# Patient Record
Sex: FEMALE | Race: OTHER | HISPANIC OR LATINO
[De-identification: names, ages, dates, MRNs, and addresses within clinical notes are randomized per-mention and may not be internally consistent; named-entity substitution may affect disease eponyms.]

---

## 2019-04-01 ENCOUNTER — TRANSCRIPTION ENCOUNTER (OUTPATIENT)
Age: 39
End: 2019-04-01

## 2019-05-06 ENCOUNTER — OUTPATIENT (OUTPATIENT)
Dept: OUTPATIENT SERVICES | Facility: HOSPITAL | Age: 39
LOS: 1 days | End: 2019-05-06
Payer: MEDICAID

## 2019-05-06 ENCOUNTER — APPOINTMENT (OUTPATIENT)
Dept: ULTRASOUND IMAGING | Facility: HOSPITAL | Age: 39
End: 2019-05-06
Payer: COMMERCIAL

## 2019-05-06 DIAGNOSIS — D25.9 LEIOMYOMA OF UTERUS, UNSPECIFIED: ICD-10-CM

## 2019-05-06 DIAGNOSIS — R10.2 PELVIC AND PERINEAL PAIN: ICD-10-CM

## 2019-05-06 PROCEDURE — 76830 TRANSVAGINAL US NON-OB: CPT

## 2019-05-06 PROCEDURE — 76830 TRANSVAGINAL US NON-OB: CPT | Mod: 26

## 2019-05-06 PROCEDURE — 76856 US EXAM PELVIC COMPLETE: CPT | Mod: 26

## 2019-05-06 PROCEDURE — 76856 US EXAM PELVIC COMPLETE: CPT

## 2019-10-21 ENCOUNTER — EMERGENCY (EMERGENCY)
Facility: HOSPITAL | Age: 39
LOS: 1 days | Discharge: HOME | End: 2019-10-21
Attending: EMERGENCY MEDICINE | Admitting: EMERGENCY MEDICINE
Payer: MEDICAID

## 2019-10-21 VITALS
DIASTOLIC BLOOD PRESSURE: 79 MMHG | HEART RATE: 85 BPM | TEMPERATURE: 98 F | WEIGHT: 117.07 LBS | RESPIRATION RATE: 17 BRPM | OXYGEN SATURATION: 98 % | HEIGHT: 63 IN | SYSTOLIC BLOOD PRESSURE: 125 MMHG

## 2019-10-21 DIAGNOSIS — N93.9 ABNORMAL UTERINE AND VAGINAL BLEEDING, UNSPECIFIED: ICD-10-CM

## 2019-10-21 LAB
ALBUMIN SERPL ELPH-MCNC: 3.6 G/DL — SIGNIFICANT CHANGE UP (ref 3.3–5)
ALP SERPL-CCNC: 68 U/L — SIGNIFICANT CHANGE UP (ref 40–120)
ALT FLD-CCNC: 10 U/L — SIGNIFICANT CHANGE UP (ref 10–45)
ANION GAP SERPL CALC-SCNC: 10 MMOL/L — SIGNIFICANT CHANGE UP (ref 5–17)
APPEARANCE UR: CLEAR — SIGNIFICANT CHANGE UP
APTT BLD: 29.6 SEC — SIGNIFICANT CHANGE UP (ref 27.5–36.3)
AST SERPL-CCNC: 10 U/L — SIGNIFICANT CHANGE UP (ref 10–40)
BACTERIA # UR AUTO: ABNORMAL /HPF
BASOPHILS # BLD AUTO: 0.04 K/UL — SIGNIFICANT CHANGE UP (ref 0–0.2)
BASOPHILS NFR BLD AUTO: 0.6 % — SIGNIFICANT CHANGE UP (ref 0–2)
BILIRUB SERPL-MCNC: 0.4 MG/DL — SIGNIFICANT CHANGE UP (ref 0.2–1.2)
BILIRUB UR-MCNC: NEGATIVE — SIGNIFICANT CHANGE UP
BLD GP AB SCN SERPL QL: SIGNIFICANT CHANGE UP
BUN SERPL-MCNC: 9 MG/DL — SIGNIFICANT CHANGE UP (ref 7–23)
CALCIUM SERPL-MCNC: 9.4 MG/DL — SIGNIFICANT CHANGE UP (ref 8.4–10.5)
CHLORIDE SERPL-SCNC: 103 MMOL/L — SIGNIFICANT CHANGE UP (ref 96–108)
CO2 SERPL-SCNC: 27 MMOL/L — SIGNIFICANT CHANGE UP (ref 22–31)
COLOR SPEC: YELLOW — SIGNIFICANT CHANGE UP
CREAT SERPL-MCNC: 0.55 MG/DL — SIGNIFICANT CHANGE UP (ref 0.5–1.3)
DIFF PNL FLD: ABNORMAL
EOSINOPHIL # BLD AUTO: 0.09 K/UL — SIGNIFICANT CHANGE UP (ref 0–0.5)
EOSINOPHIL NFR BLD AUTO: 1.3 % — SIGNIFICANT CHANGE UP (ref 0–6)
EPI CELLS # UR: SIGNIFICANT CHANGE UP
GLUCOSE SERPL-MCNC: 100 MG/DL — HIGH (ref 70–99)
GLUCOSE UR QL: NEGATIVE — SIGNIFICANT CHANGE UP
HCG SERPL-ACNC: 5158 MIU/ML — HIGH
HCT VFR BLD CALC: 38.1 % — SIGNIFICANT CHANGE UP (ref 34.5–45)
HGB BLD-MCNC: 12.8 G/DL — SIGNIFICANT CHANGE UP (ref 11.5–15.5)
IMM GRANULOCYTES NFR BLD AUTO: 0.3 % — SIGNIFICANT CHANGE UP (ref 0–1.5)
INR BLD: 1.03 RATIO — SIGNIFICANT CHANGE UP (ref 0.88–1.16)
KETONES UR-MCNC: NEGATIVE — SIGNIFICANT CHANGE UP
LEUKOCYTE ESTERASE UR-ACNC: NEGATIVE — SIGNIFICANT CHANGE UP
LYMPHOCYTES # BLD AUTO: 2.2 K/UL — SIGNIFICANT CHANGE UP (ref 1–3.3)
LYMPHOCYTES # BLD AUTO: 31.2 % — SIGNIFICANT CHANGE UP (ref 13–44)
MAGNESIUM SERPL-MCNC: 1.9 MG/DL — SIGNIFICANT CHANGE UP (ref 1.6–2.6)
MCHC RBC-ENTMCNC: 29.9 PG — SIGNIFICANT CHANGE UP (ref 27–34)
MCHC RBC-ENTMCNC: 33.6 GM/DL — SIGNIFICANT CHANGE UP (ref 32–36)
MCV RBC AUTO: 89 FL — SIGNIFICANT CHANGE UP (ref 80–100)
MONOCYTES # BLD AUTO: 0.41 K/UL — SIGNIFICANT CHANGE UP (ref 0–0.9)
MONOCYTES NFR BLD AUTO: 5.8 % — SIGNIFICANT CHANGE UP (ref 2–14)
NEUTROPHILS # BLD AUTO: 4.3 K/UL — SIGNIFICANT CHANGE UP (ref 1.8–7.4)
NEUTROPHILS NFR BLD AUTO: 60.8 % — SIGNIFICANT CHANGE UP (ref 43–77)
NITRITE UR-MCNC: NEGATIVE — SIGNIFICANT CHANGE UP
NRBC # BLD: 0 /100 WBCS — SIGNIFICANT CHANGE UP (ref 0–0)
PH UR: 7 — SIGNIFICANT CHANGE UP (ref 5–8)
PLATELET # BLD AUTO: 254 K/UL — SIGNIFICANT CHANGE UP (ref 150–400)
POTASSIUM SERPL-MCNC: 3.4 MMOL/L — LOW (ref 3.5–5.3)
POTASSIUM SERPL-SCNC: 3.4 MMOL/L — LOW (ref 3.5–5.3)
PROT SERPL-MCNC: 7.5 G/DL — SIGNIFICANT CHANGE UP (ref 6–8.3)
PROT UR-MCNC: NEGATIVE — SIGNIFICANT CHANGE UP
PROTHROM AB SERPL-ACNC: 11.5 SEC — SIGNIFICANT CHANGE UP (ref 10–12.9)
RBC # BLD: 4.28 M/UL — SIGNIFICANT CHANGE UP (ref 3.8–5.2)
RBC # FLD: 11.9 % — SIGNIFICANT CHANGE UP (ref 10.3–14.5)
RBC CASTS # UR COMP ASSIST: SIGNIFICANT CHANGE UP /HPF (ref 0–4)
SODIUM SERPL-SCNC: 140 MMOL/L — SIGNIFICANT CHANGE UP (ref 135–145)
SP GR SPEC: 1 — LOW (ref 1.01–1.02)
UROBILINOGEN FLD QL: NEGATIVE — SIGNIFICANT CHANGE UP
WBC # BLD: 7.06 K/UL — SIGNIFICANT CHANGE UP (ref 3.8–10.5)
WBC # FLD AUTO: 7.06 K/UL — SIGNIFICANT CHANGE UP (ref 3.8–10.5)
WBC UR QL: NEGATIVE /HPF — SIGNIFICANT CHANGE UP (ref 0–5)

## 2019-10-21 PROCEDURE — 76817 TRANSVAGINAL US OBSTETRIC: CPT

## 2019-10-21 PROCEDURE — 83735 ASSAY OF MAGNESIUM: CPT

## 2019-10-21 PROCEDURE — 86900 BLOOD TYPING SEROLOGIC ABO: CPT

## 2019-10-21 PROCEDURE — 76817 TRANSVAGINAL US OBSTETRIC: CPT | Mod: 26

## 2019-10-21 PROCEDURE — 99284 EMERGENCY DEPT VISIT MOD MDM: CPT

## 2019-10-21 PROCEDURE — 85610 PROTHROMBIN TIME: CPT

## 2019-10-21 PROCEDURE — 80053 COMPREHEN METABOLIC PANEL: CPT

## 2019-10-21 PROCEDURE — 85730 THROMBOPLASTIN TIME PARTIAL: CPT

## 2019-10-21 PROCEDURE — 76801 OB US < 14 WKS SINGLE FETUS: CPT | Mod: 26

## 2019-10-21 PROCEDURE — 76801 OB US < 14 WKS SINGLE FETUS: CPT

## 2019-10-21 PROCEDURE — 86850 RBC ANTIBODY SCREEN: CPT

## 2019-10-21 PROCEDURE — 85027 COMPLETE CBC AUTOMATED: CPT

## 2019-10-21 PROCEDURE — 86901 BLOOD TYPING SEROLOGIC RH(D): CPT

## 2019-10-21 PROCEDURE — 84702 CHORIONIC GONADOTROPIN TEST: CPT

## 2019-10-21 PROCEDURE — 36415 COLL VENOUS BLD VENIPUNCTURE: CPT

## 2019-10-21 PROCEDURE — 81001 URINALYSIS AUTO W/SCOPE: CPT

## 2019-10-21 PROCEDURE — 87086 URINE CULTURE/COLONY COUNT: CPT

## 2019-10-21 NOTE — ED PROVIDER NOTE - PATIENT PORTAL LINK FT
You can access the FollowMyHealth Patient Portal offered by [facility] by registering at the following website: [email or web address]. By joining [employer]’s FollowMyHealth portal, you will also be able to view your health information using other applications (apps) compatible with our system.

## 2019-10-21 NOTE — ED PROVIDER NOTE - OBJECTIVE STATEMENT
[id number] weeks pregnant by datesPresents to ER with complaints of vaginal spotting.Pelvic cramping. No complains of flank pain no complains of nausea vomiting

## 2019-10-21 NOTE — ED ADULT NURSE NOTE - NSIMPLEMENTINTERV_GEN_ALL_ED
Implemented All Universal Safety Interventions:  [place] to call system. Call bell, personal items and telephone within reach. Instruct patient to call for assistance. Room bathroom lighting operational. Non-slip footwear when patient is off stretcher. Physically safe environment: no spills, clutter or unnecessary equipment. Stretcher in lowest position, wheels locked, appropriate side rails in place.

## 2019-10-21 NOTE — ED PROVIDER NOTE - NS ED ROS FT
no weight change, no fever or chills  no recent travel, no recent abox, no sick contacts  no recent change in medications  no rash, no bruises  no visual changes no eye discharge  no cough cold or congestion,   no sob, no chest pain  no abd pain, no n/v/d  no hematuria, no change in urinary habits  no joint pain, no deformity  no headache, no paresthesia no weight change, no fever or chills  no recent travel, no recent abox, no sick contacts  no recent change in medications  no rash, no bruises  no visual changes no eye discharge  no cough cold or congestion,   no sob, no chest pain  no abd pain, no n/v/d  no hematuria, no change in urinary habits  No complaints of dysuria,   no joint pain, no deformity  no headache, no paresthesia

## 2019-10-21 NOTE — ED ADULT NURSE NOTE - OBJECTIVE STATEMENT
Pt came to ED c/o vaginal bleeding and LLQ abdominal pain. Pt with PMH of uterine fibroids came to ED c/o vaginal bleeding and LLQ abdominal pain. Pt states she is 12 weeks pregnant, LMP 19. LLQ pain began on Friday and pt thought it was gas, pain is described as intermittent and 7/10. Vaginal bleeding began today, described as dark brown and then became bright red, pt states she has "drops" and notices it when she wipes after using the bathroom. Pt is taking prenatal vitamins and scheduled for first US on 10/30/19. Pt denies urinary symptoms, denies fever, chills, n/v today. G4 T1  L1

## 2019-10-21 NOTE — ED PROVIDER NOTE - PHYSICAL EXAMINATION
Constitutional : Appears comfortably, talking in full sentences  No dizziness with change in position  Head :NC AT , no swelling  Eyes :eomi, no swelling  Mouth :mm moist,  Neck : supple, trachea in midline  Chest :Branden air entry, symm chest expansion, no distress  Heart :S1 S2 distant  Abdomen :abd soft, non tender  Musc/Skel :ext no swelling, no deformity,   no spine tenderness, distal pulses present  Neuro  :AAO 3 no focal deficits

## 2019-10-21 NOTE — ED PROVIDER NOTE - PROGRESS NOTE DETAILS
called [phone] than [id number] spoke to sara /gyn chief resident aaron called [phone] than [id number] spoke to sara /gyn chief resident aaron sara gyn, spoke to her, she will follow up patiet was told don't see hb, may be off by dates recommend a follow up study to evalaute and compare growth. patients  was told that there is fetal demise, and he was given copy of results Patient given copy of results

## 2019-10-22 ENCOUNTER — EMERGENCY (EMERGENCY)
Facility: HOSPITAL | Age: 39
LOS: 1 days | Discharge: HOME | End: 2019-10-22
Attending: EMERGENCY MEDICINE | Admitting: EMERGENCY MEDICINE
Payer: MEDICAID

## 2019-10-22 VITALS
TEMPERATURE: 98 F | SYSTOLIC BLOOD PRESSURE: 115 MMHG | HEART RATE: 100 BPM | OXYGEN SATURATION: 98 % | WEIGHT: 117.07 LBS | DIASTOLIC BLOOD PRESSURE: 82 MMHG | HEIGHT: 63 IN | RESPIRATION RATE: 18 BRPM

## 2019-10-22 VITALS
SYSTOLIC BLOOD PRESSURE: 116 MMHG | HEART RATE: 74 BPM | OXYGEN SATURATION: 99 % | DIASTOLIC BLOOD PRESSURE: 70 MMHG | RESPIRATION RATE: 16 BRPM

## 2019-10-22 LAB
ANION GAP SERPL CALC-SCNC: 11 MMOL/L — SIGNIFICANT CHANGE UP (ref 5–17)
BASOPHILS # BLD AUTO: 0.03 K/UL — SIGNIFICANT CHANGE UP (ref 0–0.2)
BASOPHILS NFR BLD AUTO: 0.2 % — SIGNIFICANT CHANGE UP (ref 0–2)
BUN SERPL-MCNC: 8 MG/DL — SIGNIFICANT CHANGE UP (ref 7–23)
CALCIUM SERPL-MCNC: 8.9 MG/DL — SIGNIFICANT CHANGE UP (ref 8.4–10.5)
CHLORIDE SERPL-SCNC: 101 MMOL/L — SIGNIFICANT CHANGE UP (ref 96–108)
CO2 SERPL-SCNC: 23 MMOL/L — SIGNIFICANT CHANGE UP (ref 22–31)
CREAT SERPL-MCNC: 0.58 MG/DL — SIGNIFICANT CHANGE UP (ref 0.5–1.3)
EOSINOPHIL # BLD AUTO: 0.06 K/UL — SIGNIFICANT CHANGE UP (ref 0–0.5)
EOSINOPHIL NFR BLD AUTO: 0.5 % — SIGNIFICANT CHANGE UP (ref 0–6)
GLUCOSE SERPL-MCNC: 115 MG/DL — HIGH (ref 70–99)
HCG SERPL-ACNC: 2415 MIU/ML — HIGH
HCT VFR BLD CALC: 37.3 % — SIGNIFICANT CHANGE UP (ref 34.5–45)
HGB BLD-MCNC: 12.7 G/DL — SIGNIFICANT CHANGE UP (ref 11.5–15.5)
IMM GRANULOCYTES NFR BLD AUTO: 0.4 % — SIGNIFICANT CHANGE UP (ref 0–1.5)
LYMPHOCYTES # BLD AUTO: 1.89 K/UL — SIGNIFICANT CHANGE UP (ref 1–3.3)
LYMPHOCYTES # BLD AUTO: 14.6 % — SIGNIFICANT CHANGE UP (ref 13–44)
MCHC RBC-ENTMCNC: 29.7 PG — SIGNIFICANT CHANGE UP (ref 27–34)
MCHC RBC-ENTMCNC: 34 GM/DL — SIGNIFICANT CHANGE UP (ref 32–36)
MCV RBC AUTO: 87.4 FL — SIGNIFICANT CHANGE UP (ref 80–100)
MONOCYTES # BLD AUTO: 0.68 K/UL — SIGNIFICANT CHANGE UP (ref 0–0.9)
MONOCYTES NFR BLD AUTO: 5.2 % — SIGNIFICANT CHANGE UP (ref 2–14)
NEUTROPHILS # BLD AUTO: 10.26 K/UL — HIGH (ref 1.8–7.4)
NEUTROPHILS NFR BLD AUTO: 79.1 % — HIGH (ref 43–77)
NRBC # BLD: 0 /100 WBCS — SIGNIFICANT CHANGE UP (ref 0–0)
PLATELET # BLD AUTO: 265 K/UL — SIGNIFICANT CHANGE UP (ref 150–400)
POTASSIUM SERPL-MCNC: 3.4 MMOL/L — LOW (ref 3.5–5.3)
POTASSIUM SERPL-SCNC: 3.4 MMOL/L — LOW (ref 3.5–5.3)
RBC # BLD: 4.27 M/UL — SIGNIFICANT CHANGE UP (ref 3.8–5.2)
RBC # FLD: 11.9 % — SIGNIFICANT CHANGE UP (ref 10.3–14.5)
SODIUM SERPL-SCNC: 135 MMOL/L — SIGNIFICANT CHANGE UP (ref 135–145)
WBC # BLD: 12.97 K/UL — HIGH (ref 3.8–10.5)
WBC # FLD AUTO: 12.97 K/UL — HIGH (ref 3.8–10.5)

## 2019-10-22 PROCEDURE — 99284 EMERGENCY DEPT VISIT MOD MDM: CPT

## 2019-10-22 RX ORDER — SODIUM CHLORIDE 9 MG/ML
1000 INJECTION INTRAMUSCULAR; INTRAVENOUS; SUBCUTANEOUS ONCE
Refills: 0 | Status: COMPLETED | OUTPATIENT
Start: 2019-10-22 | End: 2019-10-22

## 2019-10-22 RX ADMIN — SODIUM CHLORIDE 1000 MILLILITER(S): 9 INJECTION INTRAMUSCULAR; INTRAVENOUS; SUBCUTANEOUS at 23:30

## 2019-10-22 RX ADMIN — SODIUM CHLORIDE 1000 MILLILITER(S): 9 INJECTION INTRAMUSCULAR; INTRAVENOUS; SUBCUTANEOUS at 22:30

## 2019-10-22 NOTE — ED ADULT NURSE NOTE - OBJECTIVE STATEMENT
38 yr old female walked into ED accompanied by  c/o vaginal bleeding and passing clots since 1300 and intermittent dizziness; states she is saturating about 3 pads an hour; pt was seen yesterday with confirmed miscarriage; last period 07/29/2019

## 2019-10-22 NOTE — ED ADULT TRIAGE NOTE - CHIEF COMPLAINT QUOTE
Pt states she is 12 weeks pregnant with spotting that started yesterday and today progressing to heavy bleeding of more than 2 pads/hour and cramping pain. Pt states she received an ultrasound yesterday and there was no heart beat. Pt also c/o dizziness.

## 2019-10-23 VITALS
DIASTOLIC BLOOD PRESSURE: 63 MMHG | HEART RATE: 81 BPM | RESPIRATION RATE: 17 BRPM | TEMPERATURE: 99 F | OXYGEN SATURATION: 100 % | SYSTOLIC BLOOD PRESSURE: 100 MMHG

## 2019-10-23 PROBLEM — D21.9 BENIGN NEOPLASM OF CONNECTIVE AND OTHER SOFT TISSUE, UNSPECIFIED: Chronic | Status: ACTIVE | Noted: 2019-10-21

## 2019-10-23 LAB
CULTURE RESULTS: SIGNIFICANT CHANGE UP
SPECIMEN SOURCE: SIGNIFICANT CHANGE UP

## 2019-10-23 PROCEDURE — 96374 THER/PROPH/DIAG INJ IV PUSH: CPT

## 2019-10-23 PROCEDURE — 76817 TRANSVAGINAL US OBSTETRIC: CPT | Mod: 26

## 2019-10-23 PROCEDURE — 84702 CHORIONIC GONADOTROPIN TEST: CPT

## 2019-10-23 PROCEDURE — 99284 EMERGENCY DEPT VISIT MOD MDM: CPT | Mod: 25

## 2019-10-23 PROCEDURE — 36415 COLL VENOUS BLD VENIPUNCTURE: CPT

## 2019-10-23 PROCEDURE — 85027 COMPLETE CBC AUTOMATED: CPT

## 2019-10-23 PROCEDURE — 96361 HYDRATE IV INFUSION ADD-ON: CPT

## 2019-10-23 PROCEDURE — 80048 BASIC METABOLIC PNL TOTAL CA: CPT

## 2019-10-23 PROCEDURE — 76817 TRANSVAGINAL US OBSTETRIC: CPT

## 2019-10-23 RX ORDER — ACETAMINOPHEN 500 MG
975 TABLET ORAL ONCE
Refills: 0 | Status: COMPLETED | OUTPATIENT
Start: 2019-10-23 | End: 2019-10-23

## 2019-10-23 RX ORDER — KETOROLAC TROMETHAMINE 30 MG/ML
30 SYRINGE (ML) INJECTION ONCE
Refills: 0 | Status: DISCONTINUED | OUTPATIENT
Start: 2019-10-23 | End: 2019-10-23

## 2019-10-23 RX ORDER — SODIUM CHLORIDE 9 MG/ML
1000 INJECTION INTRAMUSCULAR; INTRAVENOUS; SUBCUTANEOUS ONCE
Refills: 0 | Status: COMPLETED | OUTPATIENT
Start: 2019-10-23 | End: 2019-10-23

## 2019-10-23 RX ADMIN — Medication 30 MILLIGRAM(S): at 01:27

## 2019-10-23 RX ADMIN — SODIUM CHLORIDE 1000 MILLILITER(S): 9 INJECTION INTRAMUSCULAR; INTRAVENOUS; SUBCUTANEOUS at 01:00

## 2019-10-23 RX ADMIN — Medication 30 MILLIGRAM(S): at 01:08

## 2019-10-23 RX ADMIN — SODIUM CHLORIDE 1000 MILLILITER(S): 9 INJECTION INTRAMUSCULAR; INTRAVENOUS; SUBCUTANEOUS at 01:50

## 2019-10-23 NOTE — ED PROVIDER NOTE - OBJECTIVE STATEMENT
pt c/o vag bleeding, heavy , started more mild 1pm today, since 6pm bleeding about 3 pads /hr with tissue since, assoc c pelvic cramps. feeling little dizzy. no chest pain, sob. no fever. was seen here yesterday for vag bleeding and had sono c/w fetal demise.  LMP 7/29/19

## 2019-10-23 NOTE — ED PROVIDER NOTE - PATIENT PORTAL LINK FT
You can access the FollowMyHealth Patient Portal offered by [facility] by registering at the following website: [email or web address]. By joining [employer]’s FollowMyHealth portal, you will also be able to view your health information using other applications (apps) compatible with our system.

## 2019-10-23 NOTE — ED PROVIDER NOTE - CARE PROVIDER_API CALL
Yvonne Gudino)  OBSN  General  [address] [place] [id number]  Phone: [phone]  Fax: [phone]  Follow Up Time: 1-3 Days

## 2019-10-23 NOTE — ED PROVIDER NOTE - CLINICAL SUMMARY MEDICAL DECISION MAKING FREE TEXT BOX
vag bleeding with pelvic cramps in pregnant pt, noted to have fetal demise on sono yesterday. r/o signif anemia/blood loss, retained POC.  no signs of infx. check labs hcg, repeat sono. give iv fluids. vag bleeding with pelvic cramps in pregnant pt, noted to have fetal demise on sono yesterday. r/o signif anemia/blood loss, retained POC.  no signs of infx. check labs hcg, repeat sono. give iv fluids.    0140: hcg downtrending. sono showing possible RPOC.  pt feeling  better. pain improved. vag bleeding significantly decreased. states she has mild bleeding, have not soaked 1 pad over the last hr.  case d/w OB Dr Archuleta. results of lab, sono and pt condition d/w him. he states pt can f/u with her OB. no intervention needed at this time

## 2019-10-23 NOTE — ED PROVIDER NOTE - PRIOR HOSPITAL/ED VISITS SUMMARY FREE TEXT FOR MDM OBTAINED AND REVIEWED OLD RECORDS QUESTION
ED visit from 10/21/19 reviewed. h/h noted. pelvic sono : 9 wks, no FH. c/w fetal demise.  blood type Rh+ .  hcg 5000s

## 2019-10-23 NOTE — ED PROVIDER NOTE - NSFOLLOWUPINSTRUCTIONS_ED_ALL_ED_FT
-see your OB Dr Gudino today for follow up  -return for worse  bleeding , pain, feeling faint, short of breath, fever or other concerns    Miscarriage    WHAT YOU NEED TO KNOW:    A miscarriage is the loss of a fetus within the first 20 weeks of pregnancy. A miscarriage may also be called a spontaneous  or an early pregnancy loss.     DISCHARGE INSTRUCTIONS:    Return to the emergency department if:     You have foul-smelling drainage or pus coming from your vagina.      You have heavy vaginal bleeding and soak 1 pad or more in an hour.       You have severe abdominal pain.      You feel like your heart is beating faster than normal.       You feel extremely weak or dizzy.     Contact your healthcare provider if:     You have a fever greater than 100.4°F or chills.       You have extreme sadness, grief, or feel unable to cope with what has happened.       You have questions or concerns about your condition or care.    Self-care:     Do not put anything in your vagina for 2 weeks or as directed. Do not use tampons, douche, or have sex. These actions can cause infection and pain.       Use sanitary pads as needed. You may have light bleeding or spotting for 2 weeks.       Do not take a bath or go swimming for 2 weeks or as directed. These actions may increase your risk for an infection. Take showers only.       Rest as needed. Slowly start to do more each day. Return to your daily activities as directed.       Talk to your healthcare provider about birth control. If you would like to prevent another pregnancy, ask your healthcare provider which type of birth control is best for you.       Join a support group or therapy to help you cope. A miscarriage may be very difficult for you, your partner, and other members of your family. There is no right way to feel after a miscarriage. You may feel overwhelming grief or other emotions. It may be helpful to talk to a friend, family member, or counselor about your feelings. You may worry that you could have another miscarriage. Talk to your healthcare provider about your concerns. He may be able to help you reduce the risk for another miscarriage. He may also help you find ways to cope with grief.     For more information:     The [employer]  [address] [address]  [place] [address]  Phone: [phone]  Phone: [phone]  Web Address: http://www.acog.org      [employer]  [address] [place] [address]  Web Address: [email or web address]      Follow up with your healthcare provider as directed: You may need to see your healthcare provider for blood tests or an ultrasound. Write down your questions so you remember to ask them during your visits.

## 2019-10-25 ENCOUNTER — OUTPATIENT (OUTPATIENT)
Dept: OUTPATIENT SERVICES | Facility: HOSPITAL | Age: 39
LOS: 1 days | End: 2019-10-25
Payer: MEDICAID

## 2019-10-25 ENCOUNTER — LABORATORY RESULT (OUTPATIENT)
Age: 39
End: 2019-10-25

## 2019-10-25 ENCOUNTER — APPOINTMENT (OUTPATIENT)
Dept: OBGYN | Facility: CLINIC | Age: 39
End: 2019-10-25
Payer: MEDICAID

## 2019-10-25 VITALS
WEIGHT: 116 LBS | SYSTOLIC BLOOD PRESSURE: 122 MMHG | BODY MASS INDEX: 21.35 KG/M2 | HEIGHT: 62 IN | DIASTOLIC BLOOD PRESSURE: 80 MMHG

## 2019-10-25 DIAGNOSIS — O03.9 COMPLETE OR UNSPECIFIED SPONTANEOUS ABORTION WITHOUT COMPLICATION: ICD-10-CM

## 2019-10-25 DIAGNOSIS — N76.0 ACUTE VAGINITIS: ICD-10-CM

## 2019-10-25 DIAGNOSIS — O03.9 COMPLETE OR UNSPECIFIED SPONTANEOUS ABORTION W/OUT COMPLICATION: ICD-10-CM

## 2019-10-25 LAB — HCG SERPL-ACNC: 353 MIU/ML — HIGH

## 2019-10-25 PROCEDURE — 76830 TRANSVAGINAL US NON-OB: CPT

## 2019-10-25 PROCEDURE — 84702 CHORIONIC GONADOTROPIN TEST: CPT

## 2019-10-25 PROCEDURE — G0463: CPT

## 2019-10-25 PROCEDURE — 99214 OFFICE O/P EST MOD 30 MIN: CPT | Mod: NC

## 2019-10-25 PROCEDURE — 76830 TRANSVAGINAL US NON-OB: CPT | Mod: 26,NC

## 2019-10-27 DIAGNOSIS — N93.9 ABNORMAL UTERINE AND VAGINAL BLEEDING, UNSPECIFIED: ICD-10-CM

## 2019-10-28 ENCOUNTER — TRANSCRIPTION ENCOUNTER (OUTPATIENT)
Age: 39
End: 2019-10-28

## 2019-10-28 ENCOUNTER — INPATIENT (INPATIENT)
Facility: HOSPITAL | Age: 39
LOS: 1 days | Discharge: HOME | DRG: 770 | End: 2019-10-30
Attending: OBSTETRICS & GYNECOLOGY | Admitting: OBSTETRICS & GYNECOLOGY
Payer: MEDICAID

## 2019-10-28 VITALS
DIASTOLIC BLOOD PRESSURE: 69 MMHG | RESPIRATION RATE: 18 BRPM | TEMPERATURE: 99 F | HEART RATE: 130 BPM | OXYGEN SATURATION: 99 % | SYSTOLIC BLOOD PRESSURE: 105 MMHG

## 2019-10-28 DIAGNOSIS — O03.9 COMPLETE OR UNSPECIFIED SPONTANEOUS ABORTION WITHOUT COMPLICATION: ICD-10-CM

## 2019-10-28 LAB
ALBUMIN SERPL ELPH-MCNC: 4.3 G/DL — SIGNIFICANT CHANGE UP (ref 3.3–5)
ALP SERPL-CCNC: 71 U/L — SIGNIFICANT CHANGE UP (ref 40–120)
ALT FLD-CCNC: 7 U/L — LOW (ref 10–45)
ANION GAP SERPL CALC-SCNC: 14 MMOL/L — SIGNIFICANT CHANGE UP (ref 5–17)
APPEARANCE UR: CLEAR — SIGNIFICANT CHANGE UP
APTT BLD: 25.5 SEC — LOW (ref 27.5–36.3)
AST SERPL-CCNC: 17 U/L — SIGNIFICANT CHANGE UP (ref 10–40)
BASOPHILS # BLD AUTO: 0.01 K/UL — SIGNIFICANT CHANGE UP (ref 0–0.2)
BASOPHILS NFR BLD AUTO: 0.1 % — SIGNIFICANT CHANGE UP (ref 0–2)
BILIRUB SERPL-MCNC: 0.4 MG/DL — SIGNIFICANT CHANGE UP (ref 0.2–1.2)
BILIRUB UR-MCNC: NEGATIVE — SIGNIFICANT CHANGE UP
BLD GP AB SCN SERPL QL: NEGATIVE — SIGNIFICANT CHANGE UP
BUN SERPL-MCNC: 5 MG/DL — LOW (ref 7–23)
CALCIUM SERPL-MCNC: 9.1 MG/DL — SIGNIFICANT CHANGE UP (ref 8.4–10.5)
CHLORIDE SERPL-SCNC: 103 MMOL/L — SIGNIFICANT CHANGE UP (ref 96–108)
CO2 SERPL-SCNC: 22 MMOL/L — SIGNIFICANT CHANGE UP (ref 22–31)
COLOR SPEC: SIGNIFICANT CHANGE UP
CREAT SERPL-MCNC: 0.57 MG/DL — SIGNIFICANT CHANGE UP (ref 0.5–1.3)
DIFF PNL FLD: ABNORMAL
EOSINOPHIL # BLD AUTO: 0.01 K/UL — SIGNIFICANT CHANGE UP (ref 0–0.5)
EOSINOPHIL NFR BLD AUTO: 0.1 % — SIGNIFICANT CHANGE UP (ref 0–6)
GAS PNL BLDV: SIGNIFICANT CHANGE UP
GLUCOSE SERPL-MCNC: 127 MG/DL — HIGH (ref 70–99)
GLUCOSE UR QL: NEGATIVE — SIGNIFICANT CHANGE UP
HCG SERPL-ACNC: 189.6 MIU/ML — HIGH
HCT VFR BLD CALC: 32.2 % — LOW (ref 34.5–45)
HGB BLD-MCNC: 10.9 G/DL — LOW (ref 11.5–15.5)
IMM GRANULOCYTES NFR BLD AUTO: 0.7 % — SIGNIFICANT CHANGE UP (ref 0–1.5)
INR BLD: 1.03 RATIO — SIGNIFICANT CHANGE UP (ref 0.88–1.16)
KETONES UR-MCNC: SIGNIFICANT CHANGE UP
LEUKOCYTE ESTERASE UR-ACNC: ABNORMAL
LYMPHOCYTES # BLD AUTO: 1.1 K/UL — SIGNIFICANT CHANGE UP (ref 1–3.3)
LYMPHOCYTES # BLD AUTO: 9.2 % — LOW (ref 13–44)
MCHC RBC-ENTMCNC: 29.9 PG — SIGNIFICANT CHANGE UP (ref 27–34)
MCHC RBC-ENTMCNC: 33.9 GM/DL — SIGNIFICANT CHANGE UP (ref 32–36)
MCV RBC AUTO: 88.5 FL — SIGNIFICANT CHANGE UP (ref 80–100)
MONOCYTES # BLD AUTO: 0.51 K/UL — SIGNIFICANT CHANGE UP (ref 0–0.9)
MONOCYTES NFR BLD AUTO: 4.3 % — SIGNIFICANT CHANGE UP (ref 2–14)
NEUTROPHILS # BLD AUTO: 10.26 K/UL — HIGH (ref 1.8–7.4)
NEUTROPHILS NFR BLD AUTO: 85.6 % — HIGH (ref 43–77)
NITRITE UR-MCNC: NEGATIVE — SIGNIFICANT CHANGE UP
NRBC # BLD: 0 /100 WBCS — SIGNIFICANT CHANGE UP (ref 0–0)
PH UR: 6 — SIGNIFICANT CHANGE UP (ref 5–8)
PLATELET # BLD AUTO: 269 K/UL — SIGNIFICANT CHANGE UP (ref 150–400)
POTASSIUM SERPL-MCNC: 3.9 MMOL/L — SIGNIFICANT CHANGE UP (ref 3.5–5.3)
POTASSIUM SERPL-SCNC: 3.9 MMOL/L — SIGNIFICANT CHANGE UP (ref 3.5–5.3)
PROT SERPL-MCNC: 7.3 G/DL — SIGNIFICANT CHANGE UP (ref 6–8.3)
PROT UR-MCNC: NEGATIVE — SIGNIFICANT CHANGE UP
PROTHROM AB SERPL-ACNC: 11.8 SEC — SIGNIFICANT CHANGE UP (ref 10–12.9)
RBC # BLD: 3.64 M/UL — LOW (ref 3.8–5.2)
RBC # FLD: 12.2 % — SIGNIFICANT CHANGE UP (ref 10.3–14.5)
RH IG SCN BLD-IMP: POSITIVE — SIGNIFICANT CHANGE UP
SODIUM SERPL-SCNC: 139 MMOL/L — SIGNIFICANT CHANGE UP (ref 135–145)
SP GR SPEC: 1.01 — LOW (ref 1.01–1.02)
UROBILINOGEN FLD QL: NEGATIVE — SIGNIFICANT CHANGE UP
WBC # BLD: 11.97 K/UL — HIGH (ref 3.8–10.5)
WBC # FLD AUTO: 11.97 K/UL — HIGH (ref 3.8–10.5)

## 2019-10-28 PROCEDURE — 99285 EMERGENCY DEPT VISIT HI MDM: CPT

## 2019-10-28 RX ORDER — ACETAMINOPHEN 500 MG
975 TABLET ORAL ONCE
Refills: 0 | Status: COMPLETED | OUTPATIENT
Start: 2019-10-28 | End: 2019-10-28

## 2019-10-28 RX ORDER — SODIUM CHLORIDE 9 MG/ML
2000 INJECTION INTRAMUSCULAR; INTRAVENOUS; SUBCUTANEOUS ONCE
Refills: 0 | Status: COMPLETED | OUTPATIENT
Start: 2019-10-28 | End: 2019-10-28

## 2019-10-28 RX ORDER — CEFOTETAN DISODIUM 1 G
2 VIAL (EA) INJECTION ONCE
Refills: 0 | Status: COMPLETED | OUTPATIENT
Start: 2019-10-28 | End: 2019-10-28

## 2019-10-28 RX ADMIN — SODIUM CHLORIDE 2000 MILLILITER(S): 9 INJECTION INTRAMUSCULAR; INTRAVENOUS; SUBCUTANEOUS at 22:50

## 2019-10-28 RX ADMIN — Medication 975 MILLIGRAM(S): at 22:50

## 2019-10-28 RX ADMIN — Medication 110 MILLIGRAM(S): at 23:20

## 2019-10-28 NOTE — CONSULT NOTE ADULT - PROBLEM SELECTOR RECOMMENDATION 9
RECOMMENDATIONS:  -CBC, Coags, CMP, T&S w/ confirmatory  -TVUS  -Monitor vitals  -Tylenol for fever  -Can give Cefotetan/doxy in ED  - IVF  -GYN Will re-evaluate patient after labs/TVUS to determine best treatment plan    Maurizio Garay PGY-2 d/w Dr. Platt

## 2019-10-28 NOTE — CONSULT NOTE ADULT - SUBJECTIVE AND OBJECTIVE BOX
R2 GYN ED CONSULT NOTE    SUBJECTIVE:    37yo  s/p SAB on 10/22 presenting to the ED for fever to 102F at home and tachycardia. Pt came to the ED and said she was about 12wks pregnant on 10/21. Had  TVUS demonstrating a demise. Patient had significant bleeding and passed tissue. She had a repeat TVUS on 10/22 that demonstrated no fetus. Was followed in clinic on 10/25 and had a downtrending bHCG. Today, patient complains of new onset RLQ tenderness and fever to 102F at home. Patient said she has minimal vaginal bleeding and feels well otherwise. Patient said she didnt take anything for her fever at home but came to the ED. Patient was afebrile to 99F in the ED but was tachycardic to 130.     bHC(10/21)->2400 (10/22)->353 (10/25).     Pt denies fever, chills, nausea, vomiting, diarrhea, headache, constipation, dizzyness, syncope, chest pain, palpitations, shortness of breath, dysuria, urgency, frequency, vaginal discharge/odor/pain/itching, breast lumps/bumps, dyspareunia.    Primary OB/GYN: Clinic  OBH: 1x , 3x SAB  GYNH: denies hx ov cysts, abnl paps, sti, +Fibroid 5cm anterior  PMSH: Open appendectomy  MEDS: none  ALL: nkda  SOCH: denies t/e/d; safe at home  FAMH: denies fam hx of breast/uterine/ovarian/colon cancer  ROS: negative except per hpi    OBJECTIVE:    VITAL SIGNS:  Vital Signs Last 24 Hrs  T(C): 37.3 (28 Oct 2019 19:42), Max: 37.3 (28 Oct 2019 19:42)  T(F): 99.1 (28 Oct 2019 19:42), Max: 99.1 (28 Oct 2019 19:42)  HR: 130 (28 Oct 2019 19:42) (130 - 130)  BP: 105/69 (28 Oct 2019 19:42) (105/69 - 105/69)  BP(mean): --  RR: 18 (28 Oct 2019 19:42) (18 - 18)  SpO2: 99% (28 Oct 2019 19:42) (99% - 99%)    CAPILLARY BLOOD GLUCOSE            PHYSICAL EXAM:  GEN: NAD, A&Ox3  ABD: soft, ND, mild RLQ tenderness, no guarding/rebound  SPECULUM: Scant dark blood in vault  PELVIC: No CMT, Os dilated to approximately 1cm  EXT: WWP, no edema/TTP

## 2019-10-28 NOTE — ED ADULT NURSE NOTE - OBJECTIVE STATEMENT
Pt had a miscarriage last week and now has lower abd crampy abd pain, brown vaginal discharge and fever to 102.1 earlier today.   Abd soft, denies urinary sx.

## 2019-10-28 NOTE — ED PROVIDER NOTE - ATTENDING CONTRIBUTION TO CARE
Patient recent spontaneous , downtrending BHCG, today developed lower abdominal pains and fevers so presenting to Emergency Department for evaluation.  No APAP/motrin/antipyretics to date.  No cough, sore throat, nausea, vomiting, diarrhea, dysuria.  Otherwise healthy.  No known sick contacts.  No vaginal discharge, still having small bleeding mixed with mucus.    A 14 point review of systems is negative except as in HPI or otherwise documented.    Exam:  General: Patient non toxic appearing, febrile, tachycardic otherwise vital signs within normal limits  HEENT: airway patent with moist mucous membranes  Cardiac: regular tachycardia S1/S2 with strong peripheral pulses  Respiratory: lungs clear without respiratory distress  GI: abdomen soft, suprapubic tenderness to palpation  : deferred pending OBGYN  Neuro: no gross neurologic deficits  Skin: warm, well perfused  Psych: normal mood and affect    Patient with recent spontaneous  with downtrending hemoglobin, last US in system from two days ago "Heterogeneous material containing scattered vascularity distending the endometrium and endocervical canal to 2.4 cm, likely represent retained products of conception and hemorrhage. Small focus of fluid in the endocervical canal near the cervical os" - now with fevers/chills - concern for RPOC or endometritis - will obtain labs, UA, pan cultures, treat fevers, start antibiotics, TVUS, OBGYN consultation.

## 2019-10-28 NOTE — ED PROVIDER NOTE - OBJECTIVE STATEMENT
39 y/o female recently here for incomplete  on 10/23, estimated 12 weeks pregnant by her dates presents to the ED for fever, lower abdominal pain starting earlier today. reports some discharge, no heavy bleeding. no dysuria, no cough, uri sx, vomiting or diarrhea.

## 2019-10-28 NOTE — ED ADULT NURSE NOTE - NSIMPLEMENTINTERV_GEN_ALL_ED
Implemented All Universal Safety Interventions:  [place] to call system. Call bell, personal items and telephone within reach. Instruct patient to call for assistance. Room bathroom lighting operational. Non-slip footwear when patient is off stretcher. Physically safe environment: no spills, clutter or unnecessary equipment. Stretcher in lowest position, wheels locked, appropriate side rails in place.

## 2019-10-28 NOTE — CONSULT NOTE ADULT - ASSESSMENT
ASSESSMENT: Pt is a 39YO  s/p SAB on 10/22 presenting w/ fever at home to 102F and tachycardia. Patient feels well other than mild RLQ tenderness. Diagnosis is retained products vs. Endometritis

## 2019-10-29 DIAGNOSIS — O03.4 INCOMPLETE SPONTANEOUS ABORTION WITHOUT COMPLICATION: ICD-10-CM

## 2019-10-29 DIAGNOSIS — Z09 ENCOUNTER FOR FOLLOW-UP EXAMINATION AFTER COMPLETED TREATMENT FOR CONDITIONS OTHER THAN MALIGNANT NEOPLASM: ICD-10-CM

## 2019-10-29 DIAGNOSIS — Z90.49 ACQUIRED ABSENCE OF OTHER SPECIFIED PARTS OF DIGESTIVE TRACT: Chronic | ICD-10-CM

## 2019-10-29 DIAGNOSIS — A41.9 SEPSIS, UNSPECIFIED ORGANISM: ICD-10-CM

## 2019-10-29 LAB
BASE EXCESS BLDV CALC-SCNC: -0.7 MMOL/L — SIGNIFICANT CHANGE UP (ref -2–2)
CA-I SERPL-SCNC: 1.11 MMOL/L — LOW (ref 1.12–1.3)
CHLORIDE BLDV-SCNC: 113 MMOL/L — HIGH (ref 96–108)
CO2 BLDV-SCNC: 25 MMOL/L — SIGNIFICANT CHANGE UP (ref 22–30)
CULTURE RESULTS: SIGNIFICANT CHANGE UP
GAS PNL BLDV: 140 MMOL/L — SIGNIFICANT CHANGE UP (ref 135–145)
GAS PNL BLDV: SIGNIFICANT CHANGE UP
GAS PNL BLDV: SIGNIFICANT CHANGE UP
GLUCOSE BLDV-MCNC: 110 MG/DL — HIGH (ref 70–99)
HCO3 BLDV-SCNC: 24 MMOL/L — SIGNIFICANT CHANGE UP (ref 21–29)
HCT VFR BLDA CALC: 30 % — LOW (ref 39–50)
HGB BLD CALC-MCNC: 9.8 G/DL — LOW (ref 11.5–15.5)
HOROWITZ INDEX BLDV+IHG-RTO: SIGNIFICANT CHANGE UP
LACTATE BLDV-MCNC: 1.5 MMOL/L — SIGNIFICANT CHANGE UP (ref 0.7–2)
PCO2 BLDV: 39 MMHG — SIGNIFICANT CHANGE UP (ref 35–50)
PH BLDV: 7.4 — SIGNIFICANT CHANGE UP (ref 7.35–7.45)
PO2 BLDV: 39 MMHG — SIGNIFICANT CHANGE UP (ref 25–45)
POTASSIUM BLDV-SCNC: 3.7 MMOL/L — SIGNIFICANT CHANGE UP (ref 3.5–5.3)
SAO2 % BLDV: 70 % — SIGNIFICANT CHANGE UP (ref 67–88)
SPECIMEN SOURCE: SIGNIFICANT CHANGE UP

## 2019-10-29 PROCEDURE — 76830 TRANSVAGINAL US NON-OB: CPT | Mod: 26

## 2019-10-29 PROCEDURE — 88305 TISSUE EXAM BY PATHOLOGIST: CPT | Mod: 26

## 2019-10-29 PROCEDURE — 59840 INDUCED ABORTION D&C: CPT

## 2019-10-29 PROCEDURE — 93975 VASCULAR STUDY: CPT | Mod: 26

## 2019-10-29 RX ORDER — HYDROMORPHONE HYDROCHLORIDE 2 MG/ML
0.5 INJECTION INTRAMUSCULAR; INTRAVENOUS; SUBCUTANEOUS
Refills: 0 | Status: DISCONTINUED | OUTPATIENT
Start: 2019-10-29 | End: 2019-10-29

## 2019-10-29 RX ORDER — ACETAMINOPHEN 500 MG
975 TABLET ORAL EVERY 6 HOURS
Refills: 0 | Status: DISCONTINUED | OUTPATIENT
Start: 2019-10-29 | End: 2019-10-30

## 2019-10-29 RX ORDER — SODIUM CHLORIDE 9 MG/ML
1000 INJECTION, SOLUTION INTRAVENOUS
Refills: 0 | Status: DISCONTINUED | OUTPATIENT
Start: 2019-10-29 | End: 2019-10-30

## 2019-10-29 RX ORDER — ONDANSETRON 8 MG/1
4 TABLET, FILM COATED ORAL ONCE
Refills: 0 | Status: DISCONTINUED | OUTPATIENT
Start: 2019-10-29 | End: 2019-10-29

## 2019-10-29 RX ORDER — GENTAMICIN SULFATE 40 MG/ML
250 VIAL (ML) INJECTION ONCE
Refills: 0 | Status: COMPLETED | OUTPATIENT
Start: 2019-10-29 | End: 2019-10-29

## 2019-10-29 RX ORDER — KETOROLAC TROMETHAMINE 30 MG/ML
15 SYRINGE (ML) INJECTION EVERY 6 HOURS
Refills: 0 | Status: DISCONTINUED | OUTPATIENT
Start: 2019-10-29 | End: 2019-10-29

## 2019-10-29 RX ORDER — HEPARIN SODIUM 5000 [USP'U]/ML
5000 INJECTION INTRAVENOUS; SUBCUTANEOUS EVERY 12 HOURS
Refills: 0 | Status: DISCONTINUED | OUTPATIENT
Start: 2019-10-29 | End: 2019-10-30

## 2019-10-29 RX ORDER — IBUPROFEN 200 MG
600 TABLET ORAL EVERY 6 HOURS
Refills: 0 | Status: DISCONTINUED | OUTPATIENT
Start: 2019-10-29 | End: 2019-10-30

## 2019-10-29 RX ORDER — ACETAMINOPHEN 500 MG
1000 TABLET ORAL ONCE
Refills: 0 | Status: COMPLETED | OUTPATIENT
Start: 2019-10-29 | End: 2019-10-29

## 2019-10-29 RX ORDER — SODIUM CHLORIDE 9 MG/ML
1000 INJECTION, SOLUTION INTRAVENOUS
Refills: 0 | Status: DISCONTINUED | OUTPATIENT
Start: 2019-10-29 | End: 2019-10-29

## 2019-10-29 RX ADMIN — Medication 100 MILLIGRAM(S): at 13:52

## 2019-10-29 RX ADMIN — Medication 1000 MILLIGRAM(S): at 08:30

## 2019-10-29 RX ADMIN — Medication 975 MILLIGRAM(S): at 00:25

## 2019-10-29 RX ADMIN — Medication 400 MILLIGRAM(S): at 08:04

## 2019-10-29 RX ADMIN — SODIUM CHLORIDE 125 MILLILITER(S): 9 INJECTION, SOLUTION INTRAVENOUS at 11:34

## 2019-10-29 RX ADMIN — Medication 100 MILLIGRAM(S): at 22:03

## 2019-10-29 RX ADMIN — HEPARIN SODIUM 5000 UNIT(S): 5000 INJECTION INTRAVENOUS; SUBCUTANEOUS at 18:36

## 2019-10-29 RX ADMIN — Medication 100 GRAM(S): at 00:25

## 2019-10-29 RX ADMIN — SODIUM CHLORIDE 2000 MILLILITER(S): 9 INJECTION INTRAMUSCULAR; INTRAVENOUS; SUBCUTANEOUS at 00:26

## 2019-10-29 RX ADMIN — Medication 100 MILLIGRAM(S): at 00:25

## 2019-10-29 RX ADMIN — Medication 500 MILLIGRAM(S): at 15:58

## 2019-10-29 RX ADMIN — Medication 110 MILLIGRAM(S): at 06:05

## 2019-10-29 NOTE — PRE-ANESTHESIA EVALUATION ADULT - NSANTHOSAYNRD_GEN_A_CORE
No. GALDINO screening performed.  STOP BANG Legend: 0-2 = LOW Risk; 3-4 = INTERMEDIATE Risk; 5-8 = HIGH Risk

## 2019-10-29 NOTE — BRIEF OPERATIVE NOTE - OPERATION/FINDINGS
9 week sized anteverted uterus, retained products of conception removed with suction curettage under sono guidance

## 2019-10-29 NOTE — H&P ADULT - PROBLEM SELECTOR PLAN 1
-CBC, Coags, CMP, T&S w/ confirmatory  -TVUS  -Monitor vitals  -Tylenol for fever  -Can give Cefotetan/doxy in ED  - IVF  -Add on for Urgent D&C for 9am patient agrees and was consented  -Will d/c on Doxy 100 BID for 10 days    Maurizio Garay PGY-2 d/w Dr. Platt.

## 2019-10-29 NOTE — BRIEF OPERATIVE NOTE - NSICDXBRIEFPREOP_GEN_ALL_CORE_FT
PRE-OP DIAGNOSIS:  Retained products of conception after miscarriage 29-Oct-2019 10:39:40  Dot Brown

## 2019-10-29 NOTE — H&P ADULT - ASSESSMENT
ASSESSMENT: Pt is a 37YO  s/p SAB on 10/22 presenting w/ fever at home to 102F and tachycardia. Patient feels well other than mild RLQ tenderness. Diagnosis is retained products vs. Endometritis vs. Septic Ab. ASSESSMENT: Pt is a 37YO  s/p SAB on 10/22 presenting w/ fever at home to 102F and tachycardia. Patient feels well other than mild LLQ tenderness. Diagnosis is retained products vs. Endometritis vs. Septic Ab.

## 2019-10-29 NOTE — H&P ADULT - NSHPPHYSICALEXAM_GEN_ALL_CORE
Vital Signs Last 24 Hrs  T(C): 37.2 (28 Oct 2019 23:26), Max: 37.9 (28 Oct 2019 22:10)  T(F): 98.9 (28 Oct 2019 23:26), Max: 100.3 (28 Oct 2019 22:10)  HR: 106 (28 Oct 2019 23:26) (103 - 130)  BP: 102/64 (28 Oct 2019 23:26) (101/61 - 111/69)  BP(mean): --  RR: 16 (28 Oct 2019 23:26) (16 - 18)  SpO2: 100% (28 Oct 2019 23:26) (99% - 100%)    PHYSICAL EXAM:  GEN: NAD, A&Ox3  ABD: soft, ND, mild RLQ tenderness, no guarding/rebound  SPECULUM: Scant dark blood in vault  PELVIC: No CMT, Os dilated to approximately 1cm  EXT: WWP, no edema/TTP

## 2019-10-29 NOTE — BRIEF OPERATIVE NOTE - NSICDXBRIEFPOSTOP_GEN_ALL_CORE_FT
POST-OP DIAGNOSIS:  Retained products of conception after miscarriage 29-Oct-2019 10:39:57  Dot Brown

## 2019-10-29 NOTE — PRE-OP CHECKLIST - LATEX ALLERGY
Initial Anesthesia Post-op Note    Patient: Apolinar Hdez  Procedure(s) Performed: ARTHROPLASTY KNEE TOTAL BILATERAL - BILATERAL  Anesthesia type: General    Vital Last Value   Temperature 36.3 °C (97.3 °F) (02/05/19 1230)   Pulse 90 (02/05/19 1230)   Respiratory Rate 8 (02/05/19 1230)   Non-Invasive   Blood Pressure (!) 140/91 (02/05/19 1230)   Arterial  Blood Pressure     Pulse Oximetry 96 % (02/05/19 1230)     Last 24 I/O:     Intake/Output Summary (Last 24 hours) at 2/5/2019 1236  Last data filed at 2/5/2019 1225  Gross per 24 hour   Intake 1300 ml   Output 200 ml   Net 1100 ml       PATIENT LOCATION: PACU Phase 1  POST-OP VITAL SIGNS: stable  LEVEL OF CONSCIOUSNESS: sedated  RESPIRATORY STATUS: spontaneous ventilation  CARDIOVASCULAR: blood pressure returned to baseline  HYDRATION: euvolemic    PAIN MANAGEMENT: well controlled  NAUSEA: None  AIRWAY PATENCY: patent  POST-OP ASSESSMENT: no complications, patient tolerated procedure well with no complications and sufficiently recovered from acute administration of anesthesia effects and able to participate in evaluation  COMPLICATIONS: none  HANDOFF:  Handoff to receiving nurse was performed and questions were answered (Rina LEWIS)       no

## 2019-10-29 NOTE — PROGRESS NOTE ADULT - PROBLEM SELECTOR PLAN 2
- Added on to OR  - Consent signed  - T&S x2 completed  - IV Tylenol for fever  - Continue Doxy IV  - Continue LR@125  - Continue NPO    MICHELLE Domínguez PGY2

## 2019-10-29 NOTE — PRE-ANESTHESIA EVALUATION ADULT - BMI (KG/M2)
Patient Education   Use nasal saline rinse twice daily for next week. Nasal decongestant spray for 3 days only. When You Have a Sore Throat  A sore throat can be painful. There are many reasons why you may have a sore throat. Your healthcare provider will work with you to find the cause of your sore throat. He or she will also find the best treatment for you. What Causes a Sore Throat? Sore throats can be caused or worsened by:  Â· Cold or flu viruses  Â· Bacteria  Â· Irritants such as tobacco smoke  Â· Acid reflux  A Healthy Throat  The tonsils are on the sides of the throat near the base of the tongue. They collect viruses and bacteria and help fight infection. The throat (pharynx) is the passage for air. Mucus from the nasal cavity also moves down the passage. An Inflamed Throat  The tonsils and pharynx can become inflamed due to a cold or flu virus. Postnasal drip (excess mucus draining from the nasal cavity) can irritate the throat. It can also make the throat or tonsils more likely to be infected by bacteria. Severe, untreated tonsillitis in children or adults can cause a pocket of pus (abscess) to form near the tonsil. Your Evaluation  A medical evaluation can help find the cause of your sore throat. It can also help your healthcare providerÂ choose the best treatment for you. The evaluation may include a health history, physical exam, and diagnostic tests. Health History  Your healthcare provider may ask you the following:  Â· How long has the sore throat lasted and how have you been treating it? Â· Do you have any other symptoms, such as body aches, fever, or cough? Â· Does your sore throat recur? If so, how often? How many days of school or work have you missed because of a sore throat? Â· Do you have trouble eating or swallowing? Â· Have you been told that you snore or have other sleep problems? Â· Do you have bad breath? Â· Do you cough up bad-tasting mucus?   Physical Exam  During the exam, your healthcare provider checks your ears, nose, and throat for problems. He or she also checks for swelling in the neck, and may listen to your chest.  Possible Tests  Other tests your healthcare provider may perform include:  Â· A throat swab to check for bacteria such asÂ streptococcus (the bacteria that causes strep throat)  Â· A blood test to check for mononucleosis (a viral infection)  Â· A chest x-ray to rule out pneumonia, especially if you have a cough  Treating a Sore Throat  Treatment depends on many factors. What is the likely cause? Is the problem recent? Does it keep coming back? In many cases, the best thing to do is to treat the symptoms, rest, and let the problem heal itself. Antibiotics may help clear up some infections. For cases of severe or recurring tonsillitis, the tonsils may need to be removed. Relieving Your Symptoms  Â· Donât smoke, and avoid secondhand smoke. Â· For children, try throat sprays or Popsicles. Adults and older children may try lozenges. Â· Drink warm liquids to soothe the throat and help thin mucus. Avoid alcohol, spicy foods, and acidic drinks such as orange juice. These can irritate the throat. Â· Gargle with warm saltwater (1Â teaspoon of salt toÂ 8Â ounces of warm water). Â· Use a humidifier to keep air moist and relieve throat dryness. Â· Try over-the-counter pain relievers such as acetaminophen or ibuprofen. Use as directed, and donât exceed the recommended dose. Donât give aspirin to children. Are Antibiotics Needed? If your sore throat is due to a bacterial infection, antibiotics may speed healing and prevent complications. But most sore throats are caused by cold or flu viruses. And antibiotics donât treat viral illness. In fact, using antibiotics when theyâre not needed may produce bacteria that are harder to kill. Your healthcare provider will prescribe antibiotics only if he or she thinks they are likely to help.   If Antibiotics Are Prescribed  Take the medication exactly as directed. Be sure to finish your prescription even if youâre feeling better. Â  And be sure to ask your healthcare provider or pharmacist what side effects are common and what to do about them. Is Surgery Needed? In some cases, tonsils need to be removed. This is often done as outpatient (same-day) surgery. Your healthcare provider may advise removing the tonsils in cases of:  Â· Several severe bouts of tonsillitis in a year. âSevereâ episodes include those that lead to missed days of school or work, or that need to be treated with antibiotics. Â· Tonsillitis that causes breathing problems during sleep. Â· Tonsillitis caused by food particles collecting in pouches in the tonsils (cryptic tonsillitis). Call your healthcare provider if any of the following occur:  Â· Symptoms worsen, or new symptoms develop. Â· Swollen tonsils make breathing difficult. Â· The pain is severe enough to keep you from drinking liquids. Â· A skin rash, hives, or wheezing develops. Any of these could signal an allergic reaction to antibiotics. Â· Symptoms donât improve within a week. Â· Symptoms donât improve withinÂ 2-3Â days of starting antibiotics. Â© [address] [facility] [place] [address] Pkw[address], [place], [address] All rights reserved. This information is not intended as a substitute for professional medical care. Always follow your healthcare professional's instructions. 20.7

## 2019-10-29 NOTE — H&P ADULT - ATTENDING COMMENTS
Attending Note     Patient assessed with resident. Presented with fevers T102 and tachycardia. She reports LLQ pain, intermittent.  She was previously assessed 1 week ago with diagnosis of missed AB followed by POC/empty uterus with decreasing betaHCG level.   Patient reports having chills and feeling hot with LLQ pain at home; reports pain is minimal at this time and states since being given medication and antibiotics she feels better. Denies dysuria, nausea/vomiting. Last meal was 8:30p.   On exam, minimal LLQ tenderness. Resident exam reviewed; SVE 1 cm dilated.   Labs and Imaging reviewed, sonogram noted for retained POC and hypervascularity.   Rhesus positive   Due to dilated cervix, abdominal pain, fevers at home and tachycardia with mildly elevated WBC count, highly suspicious for infected retained POC   -discussed with patient and recommend D&C, patient agreeable  -discussed with OR as there are other emergent cases she will be added on for 9am   -continue IV antibiotics and IV fluid hydration   NATALY Platt Attending Note     Patient assessed with resident. Presented with fevers T102 and tachycardia. She reports LLQ pain, intermittent.  She was previously assessed 1 week ago with diagnosis of missed AB followed by POC/empty uterus with decreasing betaHCG level.   Patient reports having chills and feeling hot with LLQ pain at home; reports pain is minimal at this time and states since being given medication and antibiotics she feels better. Denies dysuria, nausea/vomiting. Last meal was 8:30p.   On exam, minimal LLQ tenderness. Resident exam reviewed; SVE 1 cm dilated.   Labs and Imaging reviewed, sonogram noted for retained POC and hypervascularity.   Rhesus positive   Due to dilated cervix, abdominal pain, fevers at home and tachycardia with mildly elevated WBC count, highly suspicious for infected retained POC   -discussed with patient and recommend D&C, patient agreeable  -discussed with OR as there are other emergent cases she will be added on for 9am   -continue IV antibiotics and IV fluid hydration   NATALY Platt    Attending Note  Patient seen at bedside, consented, agree to D&C under ultrasound guidance

## 2019-10-29 NOTE — H&P ADULT - HISTORY OF PRESENT ILLNESS
39yo  s/p SAB on 10/22 presenting to the ED for fever to 102F at home and tachycardia. Pt came to the ED and said she was about 12wks pregnant on 10/21. Had  TVUS demonstrating a demise. Patient had significant bleeding and passed tissue. She had a repeat TVUS on 10/22 that demonstrated no fetus. Was followed in clinic on 10/25 and had a downtrending bHCG. Today, patient complains of new onset RLQ tenderness and fever to 102F at home. Patient said she has minimal vaginal bleeding and feels well otherwise. Patient said she didnt take anything for her fever at home but came to the ED. Patient was afebrile to 99F in the ED but was tachycardic to 130.     bHC(10/21)->2400 (10/22)->353 (10/25).     Pt denies fever, chills, nausea, vomiting, diarrhea, headache, constipation, dizzyness, syncope, chest pain, palpitations, shortness of breath, dysuria, urgency, frequency, vaginal discharge/odor/pain/itching, breast lumps/bumps, dyspareunia.    Primary OB/GYN: Clinic  OBH: 1x , 3x SAB  GYNH: denies hx ov cysts, abnl paps, sti, +Fibroid 5cm  PMSH: Open appendectomy  MEDS: none  ALL: nkda  SOCH: denies t/e/d; safe at home  FAMH: denies fam hx of breast/uterine/ovarian/colon cancer 37yo  s/p SAB on 10/22 presenting to the ED for fever to 102F at home and tachycardia. Pt came to the ED and said she was about 12wks pregnant on 10/21. Had  TVUS demonstrating a demise. Patient had significant bleeding and passed tissue. She had a repeat TVUS on 10/22 that demonstrated no fetus. Was followed in clinic on 10/25 and had a downtrending bHCG. Today, patient complains of new onset LLQ tenderness and fever to 102F at home. Patient said she has minimal vaginal bleeding and feels well otherwise. Patient said she didnt take anything for her fever at home but came to the ED. Patient was afebrile to 99F in the ED but was tachycardic to 130.     bHC(10/21)->2400 (10/22)->353 (10/25).     Pt denies fever, chills, nausea, vomiting, diarrhea, headache, constipation, dizzyness, syncope, chest pain, palpitations, shortness of breath, dysuria, urgency, frequency, vaginal discharge/odor/pain/itching, breast lumps/bumps, dyspareunia.    Primary OB/GYN: Clinic  OBH: 1x , 3x SAB  GYNH: denies hx ov cysts, abnl paps, sti, +Fibroid 5cm  PMSH: Open appendectomy  MEDS: none  ALL: nkda  SOCH: denies t/e/d; safe at home  FAMH: denies fam hx of breast/uterine/ovarian/colon cancer

## 2019-10-29 NOTE — H&P ADULT - NSHPLABSRESULTS_GEN_ALL_CORE
LMP: 7/29/2019    COMPARISON: Pelvic ultrasound from 10/22/2019.    TECHNIQUE:     Endovaginal and transabdominal pelvic sonogram. Color and Spectral   Doppler was performed.     FINDINGS:    Uterus: 8.4 x 5.9 x 6.2 cm. Few uterine fibroids measuring up to3.3 x   3.3 x 3.3 cm.    Endometrium: Heterogeneous and thickened endometrium measuring 16 mm with   high velocity, low-resistance, trophoblastic waveforms. Waveforms of up   to 80 cm/s noted.    Right ovary: 2.8 x 1.6 x 2 cm. Within normal limits. Normal venous   waveform.    Left ovary: 3.1 x 1.9 x 2.4 cm. Within normal limits. Normal arterial and   venous waveforms.    Fluid: None.    IMPRESSION:    Findings compatible with retained products of conception.

## 2019-10-30 ENCOUNTER — APPOINTMENT (OUTPATIENT)
Dept: OBGYN | Facility: CLINIC | Age: 39
End: 2019-10-30

## 2019-10-30 ENCOUNTER — TRANSCRIPTION ENCOUNTER (OUTPATIENT)
Age: 39
End: 2019-10-30

## 2019-10-30 VITALS
OXYGEN SATURATION: 99 % | HEART RATE: 74 BPM | DIASTOLIC BLOOD PRESSURE: 68 MMHG | RESPIRATION RATE: 18 BRPM | TEMPERATURE: 98 F | SYSTOLIC BLOOD PRESSURE: 100 MMHG

## 2019-10-30 DIAGNOSIS — Z98.890 OTHER SPECIFIED POSTPROCEDURAL STATES: ICD-10-CM

## 2019-10-30 LAB
BASOPHILS # BLD AUTO: 0.01 K/UL — SIGNIFICANT CHANGE UP (ref 0–0.2)
BASOPHILS NFR BLD AUTO: 0.1 % — SIGNIFICANT CHANGE UP (ref 0–2)
EOSINOPHIL # BLD AUTO: 0.05 K/UL — SIGNIFICANT CHANGE UP (ref 0–0.5)
EOSINOPHIL NFR BLD AUTO: 0.7 % — SIGNIFICANT CHANGE UP (ref 0–6)
HCT VFR BLD CALC: 28.4 % — LOW (ref 34.5–45)
HGB BLD-MCNC: 9.2 G/DL — LOW (ref 11.5–15.5)
IMM GRANULOCYTES NFR BLD AUTO: 0.8 % — SIGNIFICANT CHANGE UP (ref 0–1.5)
LYMPHOCYTES # BLD AUTO: 1.94 K/UL — SIGNIFICANT CHANGE UP (ref 1–3.3)
LYMPHOCYTES # BLD AUTO: 26.8 % — SIGNIFICANT CHANGE UP (ref 13–44)
MCHC RBC-ENTMCNC: 29.3 PG — SIGNIFICANT CHANGE UP (ref 27–34)
MCHC RBC-ENTMCNC: 32.4 GM/DL — SIGNIFICANT CHANGE UP (ref 32–36)
MCV RBC AUTO: 90.4 FL — SIGNIFICANT CHANGE UP (ref 80–100)
MONOCYTES # BLD AUTO: 0.56 K/UL — SIGNIFICANT CHANGE UP (ref 0–0.9)
MONOCYTES NFR BLD AUTO: 7.7 % — SIGNIFICANT CHANGE UP (ref 2–14)
NEUTROPHILS # BLD AUTO: 4.63 K/UL — SIGNIFICANT CHANGE UP (ref 1.8–7.4)
NEUTROPHILS NFR BLD AUTO: 63.9 % — SIGNIFICANT CHANGE UP (ref 43–77)
NRBC # BLD: 0 /100 WBCS — SIGNIFICANT CHANGE UP (ref 0–0)
PLATELET # BLD AUTO: 240 K/UL — SIGNIFICANT CHANGE UP (ref 150–400)
RBC # BLD: 3.14 M/UL — LOW (ref 3.8–5.2)
RBC # FLD: 12.5 % — SIGNIFICANT CHANGE UP (ref 10.3–14.5)
WBC # BLD: 7.25 K/UL — SIGNIFICANT CHANGE UP (ref 3.8–10.5)
WBC # FLD AUTO: 7.25 K/UL — SIGNIFICANT CHANGE UP (ref 3.8–10.5)

## 2019-10-30 PROCEDURE — 86900 BLOOD TYPING SEROLOGIC ABO: CPT

## 2019-10-30 PROCEDURE — 93975 VASCULAR STUDY: CPT

## 2019-10-30 PROCEDURE — 86850 RBC ANTIBODY SCREEN: CPT

## 2019-10-30 PROCEDURE — 96361 HYDRATE IV INFUSION ADD-ON: CPT

## 2019-10-30 PROCEDURE — 76830 TRANSVAGINAL US NON-OB: CPT

## 2019-10-30 PROCEDURE — 93005 ELECTROCARDIOGRAM TRACING: CPT

## 2019-10-30 PROCEDURE — 82330 ASSAY OF CALCIUM: CPT

## 2019-10-30 PROCEDURE — 82435 ASSAY OF BLOOD CHLORIDE: CPT

## 2019-10-30 PROCEDURE — 85730 THROMBOPLASTIN TIME PARTIAL: CPT

## 2019-10-30 PROCEDURE — 83605 ASSAY OF LACTIC ACID: CPT

## 2019-10-30 PROCEDURE — 82565 ASSAY OF CREATININE: CPT

## 2019-10-30 PROCEDURE — 84132 ASSAY OF SERUM POTASSIUM: CPT

## 2019-10-30 PROCEDURE — 85610 PROTHROMBIN TIME: CPT

## 2019-10-30 PROCEDURE — 99285 EMERGENCY DEPT VISIT HI MDM: CPT | Mod: 25

## 2019-10-30 PROCEDURE — 81001 URINALYSIS AUTO W/SCOPE: CPT

## 2019-10-30 PROCEDURE — 85014 HEMATOCRIT: CPT

## 2019-10-30 PROCEDURE — 84295 ASSAY OF SERUM SODIUM: CPT

## 2019-10-30 PROCEDURE — 96365 THER/PROPH/DIAG IV INF INIT: CPT

## 2019-10-30 PROCEDURE — 96375 TX/PRO/DX INJ NEW DRUG ADDON: CPT

## 2019-10-30 PROCEDURE — 85027 COMPLETE CBC AUTOMATED: CPT

## 2019-10-30 PROCEDURE — 84702 CHORIONIC GONADOTROPIN TEST: CPT

## 2019-10-30 PROCEDURE — 86901 BLOOD TYPING SEROLOGIC RH(D): CPT

## 2019-10-30 PROCEDURE — 88305 TISSUE EXAM BY PATHOLOGIST: CPT

## 2019-10-30 PROCEDURE — 87040 BLOOD CULTURE FOR BACTERIA: CPT

## 2019-10-30 PROCEDURE — 82947 ASSAY GLUCOSE BLOOD QUANT: CPT

## 2019-10-30 PROCEDURE — 87086 URINE CULTURE/COLONY COUNT: CPT

## 2019-10-30 PROCEDURE — 80053 COMPREHEN METABOLIC PANEL: CPT

## 2019-10-30 PROCEDURE — 82803 BLOOD GASES ANY COMBINATION: CPT

## 2019-10-30 RX ORDER — IBUPROFEN 200 MG
1 TABLET ORAL
Qty: 0 | Refills: 0 | DISCHARGE
Start: 2019-10-30

## 2019-10-30 RX ORDER — ACETAMINOPHEN 500 MG
3 TABLET ORAL
Qty: 0 | Refills: 0 | DISCHARGE
Start: 2019-10-30

## 2019-10-30 RX ADMIN — HEPARIN SODIUM 5000 UNIT(S): 5000 INJECTION INTRAVENOUS; SUBCUTANEOUS at 05:44

## 2019-10-30 RX ADMIN — Medication 100 MILLIGRAM(S): at 05:43

## 2019-10-30 NOTE — PROGRESS NOTE ADULT - ASSESSMENT
37yo F POD#1 s/p D&C for septic .  Patient stable and progressing well in post-operative state.
A/P: 38y Female S/P D&C   PAST MEDICAL & SURGICAL HISTORY:  Miscarriage  Fibroids: h/o uterine  History of appendectomy
A/P: 38y p/w RLQ pain and fevers at home in the setting of recent SAB. Patient febrile and tachycardic this AM. Awaiting OR for D&C under sono guidance for septic .

## 2019-10-30 NOTE — DISCHARGE NOTE PROVIDER - HOSPITAL COURSE
Patient underwent uncomplicated D&C on 10/29 for septic .  EBL 25.  Hct 32.2-> .  Patient was stable in the immediate post-operative course.  On POD#1 patient was ambulating without difficulty and voiding spontaneously.  Patient discharged on POD#1 in stable condition. Patient underwent uncomplicated D&C on 10/29 for septic .  EBL 25.  Hct 32.2-> .  Patient was stable in the immediate post-operative course and placed on oral pain medications.  On POD#1 patient was ambulating without difficulty and voiding spontaneously.  Patient met all postoperative milestones and discharged on POD#1 in stable condition with follow up with [facility]. Patient underwent uncomplicated D&C on 10/29 for septic .  EBL 25.  Hct 32.2->28.4.  Patient was stable in the immediate post-operative course and placed on oral pain medications.  On POD#1 patient was ambulating without difficulty and voiding spontaneously.  Patient met all postoperative milestones and discharged on POD#1 in stable condition with follow up with [facility].

## 2019-10-30 NOTE — PROGRESS NOTE ADULT - PROBLEM SELECTOR PLAN 1
Neuro: PO tylenol and motrin for pain  Heme: hemodynamically stable  CV: f/u AM CBC  Pulm: c/w IS   GI: c/w regular diet  : voiding  DVT prophylaxis: HSQ, increase ambulation  Dispo: eval for discharge
-Neuro - AAO x 3, Pain well controlled   -Heme - hemodynamically stable  -CV - asymptomatic, CBC in am  -Pulm - encourage IS, O2sat   -GI - Diet-  Regular Advance as Tolerated  - - voiding  -DVT prophylaxis - SCDs in place, encourage ambulation  -Meds:   acetaminophen   Tablet .. 975 milliGRAM(s) Oral every 6 hours  clindamycin IVPB 900 milliGRAM(s) IV Intermittent every 8 hours  gentamicin   IVPB 250 milliGRAM(s) IV Intermittent once  heparin  Injectable 5000 Unit(s) SubCutaneous every 12 hours  HYDROmorphone  Injectable 0.5 milliGRAM(s) IV Push every 10 minutes PRN  ibuprofen  Tablet. 600 milliGRAM(s) Oral every 6 hours  lactated ringers. 1000 milliLiter(s) IV Continuous <Continuous>  ondansetron Injectable 4 milliGRAM(s) IV Push once PRN    -Dispo: stable for transfer from PACU, once meeting all PACU milestones

## 2019-10-30 NOTE — DISCHARGE NOTE PROVIDER - NSDCCPCAREPLAN_GEN_ALL_CORE_FT
PRINCIPAL DISCHARGE DIAGNOSIS  Diagnosis: Retained products of conception after miscarriage  Assessment and Plan of Treatment:

## 2019-10-30 NOTE — PROGRESS NOTE ADULT - SUBJECTIVE AND OBJECTIVE BOX
R1 Note POD#1    Patient seen and examined at bedside, no acute overnight events.  Endorses scant spotting per vagina. No acute complaints, pain well controlled on PO meds.  Patient is ambulating and tolerating regular diet. She has passed flatus and is voiding spontaneously. Denies fever/chills/N/V/CP/SOB.  Patient counseled on disposition for future pregnancies and post-operative precautions to take when at home.    Vital Signs Last 24 Hours  T(C): 37 (10-30-19 @ 05:37), Max: 38.7 (10-29-19 @ 09:14)  HR: 82 (10-30-19 @ 05:37) (82 - 115)  BP: 100/66 (10-30-19 @ 05:37) (88/48 - 114/69)  RR: 18 (10-30-19 @ 05:37) (18 - 18)  SpO2: 99% (10-30-19 @ 05:37) (95% - 100%)    I&O's Summary    29 Oct 2019 07:01  -  30 Oct 2019 07:00  --------------------------------------------------------  IN: 125 mL / OUT: 1500 mL / NET: -1375 mL        Physical Exam:  General: NAD  CV: RRR, S1 and S2  Lungs: CTA-B  Abdomen: Soft, appropriately tender and distended, +BS  Ext: No pain or swelling    Labs:             10.9<L>  11.97<H> )-----------( 269      ( 10-28 @ 22:33 )             32.2<L>        MEDICATIONS  (STANDING):  acetaminophen   Tablet .. 975 milliGRAM(s) Oral every 6 hours  clindamycin IVPB 900 milliGRAM(s) IV Intermittent every 8 hours  heparin  Injectable 5000 Unit(s) SubCutaneous every 12 hours  ibuprofen  Tablet. 600 milliGRAM(s) Oral every 6 hours  lactated ringers. 1000 milliLiter(s) (125 mL/Hr) IV Continuous <Continuous>    MEDICATIONS  (PRN):      Lavon Roberson, [id number]
GYN Progress Note     HD#2    Patient seen and examined at bedside. Awaiting OR for D&C under sono guidance for septic . Patient febrile and tachycardic this AM. Reports fevers, chills, pelvic cramping. Additional ROS negative. NPO pending OR.     Vital Signs Last 24 Hours  T(C): 38.7 (10-29-19 @ 06:11), Max: 38.7 (10-29-19 @ 06:11)  HR: 115 (10-29-19 @ 06:11) (94 - 130)  BP: 114/69 (10-29-19 @ 06:11) (95/56 - 114/69)  RR: 18 (10-29-19 @ 06:11) (16 - 18)  SpO2: 99% (10-29-19 @ 06:11) (99% - 100%)    I&O's Summary      Physical Exam:  General: NAD  CV: tachycardic to 110s, regular rhythm  Abdomen: soft, non-tender, non-distended  : no bleeding on pad  Ext: no pain or swelling     Labs:                        10.9   11.97 )-----------( 269      ( 28 Oct 2019 22:33 )             32.2   baso 0.1    eos 0.1    imm gran 0.7    lymph 9.2    mono 4.3    poly 85.6       MEDICATIONS  (STANDING):  acetaminophen  IVPB .. 1000 milliGRAM(s) IV Intermittent once  doxycycline IVPB 100 milliGRAM(s) IV Intermittent every 12 hours  lactated ringers. 1000 milliLiter(s) (125 mL/Hr) IV Continuous <Continuous>    MEDICATIONS  (PRN):  ketorolac   Injectable 15 milliGRAM(s) IV Push every 6 hours PRN Severe Pain (7 - 10)
POST-OP CHECK  PA Note    Allergies    No Known Allergies    Intolerances        S: Pt awake and alert resting comfortaby in bed       Pain controlled. Pt denies N/V, SOB, CP, palpitations.  neg Flatus neg  PO  pos Void    O:   T(C): 36.2 (10-29-19 @ 11:30), Max: 38.7 (10-29-19 @ 09:14)  HR: 96 (10-29-19 @ 11:30) (96 - 115)  BP: 97/56 (10-29-19 @ 11:15) (88/48 - 114/69)  RR: 18 (10-29-19 @ 11:30) (18 - 18)  SpO2: 98% (10-29-19 @ 11:30) (98% - 100%)  Wt(kg): --  I&O's Summary    29 Oct 2019 07:01  -  29 Oct 2019 12:18  --------------------------------------------------------  IN: 125 mL / OUT: 500 mL / NET: -375 mL                         OR           PACU  (I)                              IVF LR@125  (O)  EBL    CV: RRR  Lungs: CTA B/L  Abd: +BS, soft, NT  Ext: SCDs in place, Neg Maribethns B/L

## 2019-10-30 NOTE — DISCHARGE NOTE NURSING/CASE MANAGEMENT/SOCIAL WORK - PATIENT PORTAL LINK FT
You can access the FollowMyHealth Patient Portal offered by [facility] by registering at the following website: [email or web address]. By joining [facility]’s FollowMyHealth portal, you will also be able to view your health information using other applications (apps) compatible with our system.

## 2019-10-30 NOTE — DISCHARGE NOTE PROVIDER - NSDCMRMEDTOKEN_GEN_ALL_CORE_FT
acetaminophen 325 mg oral tablet: 3 tab(s) orally every 6 hours  ibuprofen 600 mg oral tablet: 1 tab(s) orally every 6 hours acetaminophen 325 mg oral tablet: 3 tab(s) orally every 6 hours  amoxicillin-clavulanate 875 mg-125 mg oral tablet: 1 tab(s) orally 2 times a day   ibuprofen 600 mg oral tablet: 1 tab(s) orally every 6 hours

## 2019-10-30 NOTE — DISCHARGE NOTE PROVIDER - NSFOLLOWUPCLINICS_GEN_ALL_ED_FT
[facility]stetric[facility]  Gynceology/OB  [address] [place] [address]  Phone: [phone]  Fax:   Follow Up Time: 7-10 Days

## 2019-10-30 NOTE — PROGRESS NOTE ADULT - ATTENDING COMMENTS
pt seen and examined. clinically improved. denies pain. wbc nl. will dc on po augmentin. to f/u in clinic. reviewed pain/fever precautions.    NATALY Palmer
Patient seen at bedside, fevers, tachycardia, abdominal pain with septic , will plan for D&C, patient spoken to at length about procedure, agrees with plan of care, willing to accept blood products in case of emergency, al questions answered.

## 2019-10-30 NOTE — PROGRESS NOTE ADULT - PROBLEM SELECTOR PROBLEM 1
S/P D&C (status post dilation and curettage)
Postop check
Retained products of conception after miscarriage

## 2019-11-01 PROBLEM — O03.9 COMPLETE OR UNSPECIFIED SPONTANEOUS ABORTION WITHOUT COMPLICATION: Chronic | Status: ACTIVE | Noted: 2019-10-29

## 2019-11-03 LAB
CULTURE RESULTS: SIGNIFICANT CHANGE UP
CULTURE RESULTS: SIGNIFICANT CHANGE UP
SPECIMEN SOURCE: SIGNIFICANT CHANGE UP
SPECIMEN SOURCE: SIGNIFICANT CHANGE UP

## 2019-11-03 NOTE — HISTORY OF PRESENT ILLNESS
[Approximately ___ (Month)] : the LMP was approximately [unfilled] month(s) ago [Monogamous] : is monogamous [Regular Cycle Intervals] : periods have been regular [Sexually Active] : is sexually active [Contraception] : does not use contraception

## 2019-11-03 NOTE — PHYSICAL EXAM
[Discharge] : a  ~M vaginal discharge was present [Normal] : uterus [Scant] : scant [Bloody] : bloody [No Bleeding] : there was no active vaginal bleeding [Uterine Adnexae] : were not tender and not enlarged [Foul Smelling] : not foul smelling

## 2019-11-03 NOTE — PROCEDURE
[Transvaginal Ultrasound] : transvaginal ultrasound [FreeTextEntry3] : no tissue visualized in uterus\par no yolk sac

## 2019-11-04 LAB — SURGICAL PATHOLOGY STUDY: SIGNIFICANT CHANGE UP

## 2019-11-07 ENCOUNTER — APPOINTMENT (OUTPATIENT)
Dept: OBGYN | Facility: CLINIC | Age: 39
End: 2019-11-07
Payer: MEDICAID

## 2019-11-07 ENCOUNTER — OUTPATIENT (OUTPATIENT)
Dept: OUTPATIENT SERVICES | Facility: HOSPITAL | Age: 39
LOS: 1 days | End: 2019-11-07
Payer: MEDICAID

## 2019-11-07 VITALS
BODY MASS INDEX: 21.08 KG/M2 | SYSTOLIC BLOOD PRESSURE: 120 MMHG | WEIGHT: 115.25 LBS | DIASTOLIC BLOOD PRESSURE: 88 MMHG

## 2019-11-07 DIAGNOSIS — Z90.49 ACQUIRED ABSENCE OF OTHER SPECIFIED PARTS OF DIGESTIVE TRACT: Chronic | ICD-10-CM

## 2019-11-07 DIAGNOSIS — Z98.890 OTHER SPECIFIED POSTPROCEDURAL STATES: ICD-10-CM

## 2019-11-07 DIAGNOSIS — N76.0 ACUTE VAGINITIS: ICD-10-CM

## 2019-11-07 PROCEDURE — 99213 OFFICE O/P EST LOW 20 MIN: CPT | Mod: NC

## 2019-11-07 PROCEDURE — G0463: CPT

## 2019-11-08 NOTE — PHYSICAL EXAM
[Normal] : cervix [No Bleeding] : there was no active vaginal bleeding [Tenderness] : nontender [Mass ___ cm] : no uterine mass was palpated [Adnexa Tenderness] : were not tender [Ovarian Mass (___ Cm)] : there were no adnexal masses [FreeTextEntry5] : +vircryl suture noted on ant rt lip of cvx

## 2019-11-13 DIAGNOSIS — Z98.890 OTHER SPECIFIED POSTPROCEDURAL STATES: ICD-10-CM

## 2020-06-09 ENCOUNTER — TRANSCRIPTION ENCOUNTER (OUTPATIENT)
Age: 40
End: 2020-06-09

## 2020-06-15 ENCOUNTER — TRANSCRIPTION ENCOUNTER (OUTPATIENT)
Age: 40
End: 2020-06-15

## 2020-07-17 ENCOUNTER — APPOINTMENT (OUTPATIENT)
Dept: DERMATOLOGY | Facility: CLINIC | Age: 40
End: 2020-07-17

## 2021-05-20 NOTE — ED ADULT NURSE NOTE - NSFALLRSKASSESASSIST_ED_ALL_ED
May 22, 2021        Jelena Lazcano  [address] [address]  [facility] [address]      Dear Ms. Lazcano,    The TB screen was done at your recent physical on May 20, 2021, was negative.  You are healthy & free of communicable disease.            Sincerely,      Anay Bautista NP    Internal Medicine Department  [place] [address] [place] [id number] [phone]   yes

## 2021-07-27 NOTE — ED PROVIDER NOTE - GENITOURINARY VULVA
Dear Jennie,     Here are your recent results.  These results do not change our current plan of care.     If you have any questions, please contact the nurse coordinator according to your clinic location:     [facility]:  [place]: [phone]    [facility]  [place]: [phone]    [facility]:  [place]: [phone]      Monico Rocha MD    Pediatric Gastroenterology, Hepatology and Nutrition  [facility]              
normal

## 2021-09-24 NOTE — ED PROVIDER NOTE - NS ED ATTENDING STATEMENT MOD
Health Maintenance Due   Topic Date Due   • Influenza Vaccine (1) 09/01/2021       Patient is due for the topics as listed above and wishes to proceed with them. Education     Recent PHQ 2/9 Score    PHQ 2:  Date Adult PHQ 2 Score Adult PHQ 2 Interpretation   9/24/2021 0 No further screening needed       PHQ 9:  Date Adult PHQ 9 Score   3/7/2019 0        Attending Only

## 2021-10-13 ENCOUNTER — APPOINTMENT (OUTPATIENT)
Dept: OBGYN | Facility: CLINIC | Age: 41
End: 2021-10-13

## 2021-10-27 ENCOUNTER — LABORATORY RESULT (OUTPATIENT)
Age: 41
End: 2021-10-27

## 2021-10-27 ENCOUNTER — RESULT REVIEW (OUTPATIENT)
Age: 41
End: 2021-10-27

## 2021-10-27 ENCOUNTER — OUTPATIENT (OUTPATIENT)
Dept: OUTPATIENT SERVICES | Facility: HOSPITAL | Age: 41
LOS: 1 days | End: 2021-10-27
Payer: MEDICAID

## 2021-10-27 ENCOUNTER — APPOINTMENT (OUTPATIENT)
Dept: OBGYN | Facility: CLINIC | Age: 41
End: 2021-10-27
Payer: MEDICAID

## 2021-10-27 VITALS — SYSTOLIC BLOOD PRESSURE: 160 MMHG | DIASTOLIC BLOOD PRESSURE: 100 MMHG

## 2021-10-27 VITALS — DIASTOLIC BLOOD PRESSURE: 90 MMHG | SYSTOLIC BLOOD PRESSURE: 130 MMHG

## 2021-10-27 DIAGNOSIS — N76.0 ACUTE VAGINITIS: ICD-10-CM

## 2021-10-27 DIAGNOSIS — Z01.419 ENCOUNTER FOR GYNECOLOGICAL EXAMINATION (GENERAL) (ROUTINE) W/OUT ABNORMAL FINDINGS: ICD-10-CM

## 2021-10-27 DIAGNOSIS — Z90.49 ACQUIRED ABSENCE OF OTHER SPECIFIED PARTS OF DIGESTIVE TRACT: Chronic | ICD-10-CM

## 2021-10-27 DIAGNOSIS — Z31.9 ENCOUNTER FOR PROCREATIVE MANAGEMENT, UNSPECIFIED: ICD-10-CM

## 2021-10-27 PROCEDURE — 99396 PREV VISIT EST AGE 40-64: CPT

## 2021-10-27 PROCEDURE — 87591 N.GONORRHOEAE DNA AMP PROB: CPT

## 2021-10-27 PROCEDURE — 84146 ASSAY OF PROLACTIN: CPT

## 2021-10-27 PROCEDURE — G0463: CPT

## 2021-10-27 PROCEDURE — 84443 ASSAY THYROID STIM HORMONE: CPT

## 2021-10-27 PROCEDURE — 87624 HPV HI-RISK TYP POOLED RSLT: CPT

## 2021-10-27 PROCEDURE — 82166 ASSAY ANTI-MULLERIAN HORM: CPT

## 2021-10-27 PROCEDURE — 86765 RUBEOLA ANTIBODY: CPT

## 2021-10-27 PROCEDURE — 86762 RUBELLA ANTIBODY: CPT

## 2021-10-27 PROCEDURE — 87491 CHLMYD TRACH DNA AMP PROBE: CPT

## 2021-10-27 PROCEDURE — 86780 TREPONEMA PALLIDUM: CPT

## 2021-10-27 PROCEDURE — 87340 HEPATITIS B SURFACE AG IA: CPT

## 2021-10-27 PROCEDURE — 87389 HIV-1 AG W/HIV-1&-2 AB AG IA: CPT

## 2021-10-27 PROCEDURE — 86787 VARICELLA-ZOSTER ANTIBODY: CPT

## 2021-10-27 NOTE — PHYSICAL EXAM
[Awake] : awake [Alert] : alert [Soft] : soft [Oriented x3] : oriented to person, place, and time [Normal] : uterus [Labia Majora] : labia major [Labia Minora] : labia minora [No Bleeding] : there was no active vaginal bleeding [Pap Obtained] : a Pap smear was performed [Uterine Adnexae] : were not tender and not enlarged [Acute Distress] : no acute distress [LAD] : no lymphadenopathy [Thyroid Nodule] : no thyroid nodule [Goiter] : no goiter [Mass] : no breast mass [Nipple Discharge] : no nipple discharge [Axillary LAD] : no axillary lymphadenopathy [Tender] : non tender [Distended] : not distended [H/Smegaly] : no hepatosplenomegaly [Depressed Mood] : not depressed [Flat Affect] : affect not flat [Motion Tenderness] : there was no cervical motion tenderness [FreeTextEntry6] : no abnormalities noted, no fullness, tenderness or masses on exam

## 2021-10-27 NOTE — HISTORY OF PRESENT ILLNESS
[Definite:  ___ (Date)] : the last menstrual period was [unfilled] [Normal Amount/Duration] : was of a normal amount and duration [Regular Cycle Intervals] : periods have been regular [Sexually Active] : is sexually active [Monogamous] : is monogamous [Male ___] : [unfilled] male [Spotting Between  Menses] : no spotting between menses [Menstrual Cramps] : no menstrual cramps [Contraception] : does not use contraception

## 2021-10-28 LAB
C TRACH RRNA SPEC QL NAA+PROBE: SIGNIFICANT CHANGE UP
HBV SURFACE AG SER-ACNC: SIGNIFICANT CHANGE UP
HIV 1+2 AB+HIV1 P24 AG SERPL QL IA: SIGNIFICANT CHANGE UP
HPV HIGH+LOW RISK DNA PNL CVX: SIGNIFICANT CHANGE UP
MEV IGG SER-ACNC: 6.1 AU/ML — SIGNIFICANT CHANGE UP
MEV IGG+IGM SER-IMP: NEGATIVE — SIGNIFICANT CHANGE UP
N GONORRHOEA RRNA SPEC QL NAA+PROBE: SIGNIFICANT CHANGE UP
PROLACTIN SERPL-MCNC: 10.4 NG/ML — SIGNIFICANT CHANGE UP (ref 3.4–24.1)
RUBV IGG SER-ACNC: <0.1 INDEX — SIGNIFICANT CHANGE UP
RUBV IGG SER-IMP: NEGATIVE — SIGNIFICANT CHANGE UP
SPECIMEN SOURCE: SIGNIFICANT CHANGE UP
T PALLIDUM AB TITR SER: NEGATIVE — SIGNIFICANT CHANGE UP
T4 FREE+ TSH PNL SERPL: 1.41 UIU/ML — SIGNIFICANT CHANGE UP (ref 0.27–4.2)
VZV IGG FLD QL IA: 662 INDEX — SIGNIFICANT CHANGE UP
VZV IGG FLD QL IA: POSITIVE — SIGNIFICANT CHANGE UP

## 2021-10-31 LAB — CYTOLOGY SPEC DOC CYTO: SIGNIFICANT CHANGE UP

## 2021-11-03 DIAGNOSIS — Z01.419 ENCOUNTER FOR GYNECOLOGICAL EXAMINATION (GENERAL) (ROUTINE) WITHOUT ABNORMAL FINDINGS: ICD-10-CM

## 2021-11-03 DIAGNOSIS — Z31.9 ENCOUNTER FOR PROCREATIVE MANAGEMENT, UNSPECIFIED: ICD-10-CM

## 2021-11-04 LAB — ANTI-MULLERIAN HORMONE: 2.19 NG/ML — SIGNIFICANT CHANGE UP

## 2021-11-22 ENCOUNTER — LABORATORY RESULT (OUTPATIENT)
Age: 41
End: 2021-11-22

## 2021-12-28 ENCOUNTER — OUTPATIENT (OUTPATIENT)
Dept: OUTPATIENT SERVICES | Facility: HOSPITAL | Age: 41
LOS: 1 days | End: 2021-12-28
Payer: MEDICAID

## 2021-12-28 ENCOUNTER — RESULT REVIEW (OUTPATIENT)
Age: 41
End: 2021-12-28

## 2021-12-28 ENCOUNTER — APPOINTMENT (OUTPATIENT)
Dept: MAMMOGRAPHY | Facility: HOSPITAL | Age: 41
End: 2021-12-28
Payer: MEDICAID

## 2021-12-28 ENCOUNTER — APPOINTMENT (OUTPATIENT)
Dept: ULTRASOUND IMAGING | Facility: HOSPITAL | Age: 41
End: 2021-12-28
Payer: MEDICAID

## 2021-12-28 DIAGNOSIS — Z90.49 ACQUIRED ABSENCE OF OTHER SPECIFIED PARTS OF DIGESTIVE TRACT: Chronic | ICD-10-CM

## 2021-12-28 DIAGNOSIS — Z01.419 ENCOUNTER FOR GYNECOLOGICAL EXAMINATION (GENERAL) (ROUTINE) WITHOUT ABNORMAL FINDINGS: ICD-10-CM

## 2021-12-28 PROCEDURE — 76641 ULTRASOUND BREAST COMPLETE: CPT | Mod: 26,50

## 2021-12-28 PROCEDURE — 77063 BREAST TOMOSYNTHESIS BI: CPT

## 2021-12-28 PROCEDURE — 77067 SCR MAMMO BI INCL CAD: CPT

## 2021-12-28 PROCEDURE — 77066 DX MAMMO INCL CAD BI: CPT

## 2021-12-28 PROCEDURE — 77067 SCR MAMMO BI INCL CAD: CPT | Mod: 26,59

## 2021-12-28 PROCEDURE — 76641 ULTRASOUND BREAST COMPLETE: CPT

## 2021-12-28 PROCEDURE — 77066 DX MAMMO INCL CAD BI: CPT | Mod: 26,GG

## 2021-12-28 PROCEDURE — 77063 BREAST TOMOSYNTHESIS BI: CPT | Mod: 26,59

## 2022-01-04 ENCOUNTER — APPOINTMENT (OUTPATIENT)
Dept: ULTRASOUND IMAGING | Facility: CLINIC | Age: 42
End: 2022-01-04
Payer: MEDICAID

## 2022-01-04 ENCOUNTER — RESULT REVIEW (OUTPATIENT)
Age: 42
End: 2022-01-04

## 2022-01-04 ENCOUNTER — OUTPATIENT (OUTPATIENT)
Dept: OUTPATIENT SERVICES | Facility: HOSPITAL | Age: 42
LOS: 1 days | End: 2022-01-04
Payer: MEDICAID

## 2022-01-04 DIAGNOSIS — Z00.8 ENCOUNTER FOR OTHER GENERAL EXAMINATION: ICD-10-CM

## 2022-01-04 DIAGNOSIS — Z90.49 ACQUIRED ABSENCE OF OTHER SPECIFIED PARTS OF DIGESTIVE TRACT: Chronic | ICD-10-CM

## 2022-01-04 PROCEDURE — 19084 BX BREAST ADD LESION US IMAG: CPT | Mod: RT

## 2022-01-04 PROCEDURE — 19084 BX BREAST ADD LESION US IMAG: CPT

## 2022-01-04 PROCEDURE — 88305 TISSUE EXAM BY PATHOLOGIST: CPT

## 2022-01-04 PROCEDURE — 19083 BX BREAST 1ST LESION US IMAG: CPT

## 2022-01-04 PROCEDURE — 19083 BX BREAST 1ST LESION US IMAG: CPT | Mod: LT

## 2022-01-04 PROCEDURE — 88305 TISSUE EXAM BY PATHOLOGIST: CPT | Mod: 26

## 2022-01-04 PROCEDURE — A4648: CPT

## 2022-01-04 PROCEDURE — 77066 DX MAMMO INCL CAD BI: CPT | Mod: 26

## 2022-01-04 PROCEDURE — 77066 DX MAMMO INCL CAD BI: CPT

## 2022-01-06 LAB — SURGICAL PATHOLOGY STUDY: SIGNIFICANT CHANGE UP

## 2023-11-24 ENCOUNTER — NON-APPOINTMENT (OUTPATIENT)
Age: 43
End: 2023-11-24

## 2023-11-28 ENCOUNTER — NON-APPOINTMENT (OUTPATIENT)
Age: 43
End: 2023-11-28

## 2024-11-12 NOTE — ED ADULT NURSE NOTE - MUSCULOSKELETAL ASSESSMENT
Patient of . Patient's Zepbound 7.5 mg was sent to wrong pharmacy. Patient would like medication refills sent to [facility], [place] from now on.   . WDL

## 2025-01-30 ENCOUNTER — NON-APPOINTMENT (OUTPATIENT)
Age: 45
End: 2025-01-30